# Patient Record
Sex: FEMALE | Race: ASIAN | NOT HISPANIC OR LATINO | URBAN - METROPOLITAN AREA
[De-identification: names, ages, dates, MRNs, and addresses within clinical notes are randomized per-mention and may not be internally consistent; named-entity substitution may affect disease eponyms.]

---

## 2021-01-25 ENCOUNTER — EMERGENCY (EMERGENCY)
Facility: HOSPITAL | Age: 31
LOS: 1 days | Discharge: HOME | End: 2021-01-25
Attending: EMERGENCY MEDICINE | Admitting: EMERGENCY MEDICINE
Payer: MEDICAID

## 2021-01-25 VITALS
SYSTOLIC BLOOD PRESSURE: 131 MMHG | RESPIRATION RATE: 18 BRPM | DIASTOLIC BLOOD PRESSURE: 83 MMHG | OXYGEN SATURATION: 100 % | TEMPERATURE: 98 F | HEART RATE: 76 BPM

## 2021-01-25 PROCEDURE — 99283 EMERGENCY DEPT VISIT LOW MDM: CPT

## 2021-01-25 RX ORDER — HYDROXYZINE HCL 10 MG
1 TABLET ORAL
Qty: 15 | Refills: 0
Start: 2021-01-25 | End: 2021-01-29

## 2021-01-25 RX ORDER — DIPHENHYDRAMINE HCL 50 MG
25 CAPSULE ORAL ONCE
Refills: 0 | Status: COMPLETED | OUTPATIENT
Start: 2021-01-25 | End: 2021-01-25

## 2021-01-25 RX ORDER — FAMOTIDINE 10 MG/ML
1 INJECTION INTRAVENOUS
Qty: 10 | Refills: 0
Start: 2021-01-25 | End: 2021-02-03

## 2021-01-25 RX ORDER — FAMOTIDINE 10 MG/ML
20 INJECTION INTRAVENOUS ONCE
Refills: 0 | Status: COMPLETED | OUTPATIENT
Start: 2021-01-25 | End: 2021-01-25

## 2021-01-25 RX ORDER — EPINEPHRINE 0.3 MG/.3ML
1 INJECTION INTRAMUSCULAR; SUBCUTANEOUS
Qty: 1 | Refills: 0
Start: 2021-01-25 | End: 2021-01-25

## 2021-01-25 RX ADMIN — Medication 50 MILLIGRAM(S): at 12:35

## 2021-01-25 RX ADMIN — Medication 25 MILLIGRAM(S): at 12:35

## 2021-01-25 RX ADMIN — FAMOTIDINE 20 MILLIGRAM(S): 10 INJECTION INTRAVENOUS at 12:35

## 2021-01-25 NOTE — ED ADULT NURSE NOTE - OBJECTIVE STATEMENT
Pt received to intake area complaining of allergic reaction. Pt complaining of bilateral leg itching that spread to her face and body. Pt denies chest pain, sob. Pt medicated as ordered.

## 2021-01-25 NOTE — ED PROVIDER NOTE - PATIENT PORTAL LINK FT
You can access the FollowMyHealth Patient Portal offered by [facility] by registering at the following website: [email or web address]. By joining [employer]’s FollowMyHealth portal, you will also be able to view your health information using other applications (apps) compatible with our system.

## 2021-01-25 NOTE — ED PROVIDER NOTE - CLINICAL SUMMARY MEDICAL DECISION MAKING FREE TEXT BOX
29 Y/O F w/ no PMH presents to ER for allergic reaction. Hives throughout body. Tolerating secretions, speaking full sentences, no airway compromise. Will provide outpt course of steroids, atarax and pepcid. Discharge center coordinating Derm follow up. Will provide allergy referral. Return precautions provided. 31 Y/O F w/ no PMH presents to ER for allergic reaction. Hives throughout body. Tolerating secretions, speaking full sentences, no airway compromise. Will provide outpt course of steroids, atarax and pepcid. Discharge center coordinating allergy follow up. Return precautions provided.

## 2021-01-25 NOTE — ED PROVIDER NOTE - PHYSICAL EXAMINATION
Vital signs reviewed.   CONSTITUTIONAL: Well-appearing; well-nourished; in no apparent distress. Non-toxic appearing.   HEAD: Normocephalic, atraumatic.  EYES: PERRL, EOM intact, conjunctiva and no sclera injection noted  ENT: normal nose; no rhinorrhea; normal pharynx with no tonsillar hypertrophy, no erythema, no exudate, no trismus, tolerating secretions, patent airway  NECK: Supple; non-tender  CARD: Normal S1, S2  RESP: Normal chest excursion with respiration; breath sounds clear and equal bilaterally  ABD/GI: soft, non-distended; non-tender  EXT/MS: moves all extremities; distal pulses are normal, no pedal edema.  SKIN: Diffuse hives throughout chest, anterior/posterior neck, upper/lower back, non-tender, no discharge noted. Minimal swelling to LT hand. Upper lip swelling.  NEURO: Awake, alert, oriented x 3, no gross deficits, CN II-XII grossly intact, no motor or sensory deficit noted.  PSYCH: Normal mood; appropriate affect.

## 2021-01-25 NOTE — ED PROVIDER NOTE - NSFOLLOWUPINSTRUCTIONS_ED_ALL_ED_FT
1) Please follow up with Dermatologist for further evaluation. Please follow up with allergist  2) Return to the ED immediately for new or worsening symptoms including chest pain, shortness of breath, difficulty breathing breath, nausea/vomiting  3) Please continue to take any home medications as prescribed. Take Tylenol 325 mg every 4 hours for pain relief/fever control or ibuprofen 600 mg every 6 hours for pain relief/fever control  4) Your test results from your ED visit were discussed with you prior to discharge  5) You were provided with a copy of your test results 1) Please follow up with scheduled allergist appointment  2) Return to the ED immediately for new or worsening symptoms including chest pain, shortness of breath, difficulty breathing breath, nausea/vomiting  3) Please continue to take any home medications as prescribed. Take Tylenol 325 mg every 4 hours for pain relief/fever control or ibuprofen 600 mg every 6 hours for pain relief/fever control  4) Your test results from your ED visit were discussed with you prior to discharge  5) You were provided with a copy of your test results

## 2021-01-25 NOTE — ED PROVIDER NOTE - ATTENDING CONTRIBUTION TO CARE
I performed a history and physical exam of the patient and discussed their management with the PA. I reviewed the PA's note and agree with the documented findings and plan of care. I have edited as appropriate. My medical decision making and observations are found above.   pt p/w urticaria, dysphagia. neg voice changes, neg anaphylaxis, d/c with steroids, pepcid, and rx for epi pen. instructed to use if begins having voice changes, worsening sob,   d/c with f/u to allergist.

## 2021-01-25 NOTE — ED PROVIDER NOTE - OBJECTIVE STATEMENT
29 Y/O F w/ no PMH presents to ER for allergic reaction. Symptoms began 2 nights ago with itching to B/L legs has since spread throughout body and face. Admits to continued night awakenings due to itching. Has tried oral Benadryl with relief. Tolerating PO intake. Denies fever, nausea/vomiting, dizziness, headache, difficulty breathing or SOB. No new detergents, soap, shampoo, facial cleansers, clothing, bedding and no at home has symptoms.

## 2021-01-25 NOTE — ED PROVIDER NOTE - NS ED ROS FT
Constitutional: (-) fever   Head: Normal cephalic, Atraumatic  Eyes/ENT: (-) vision changes  Cardiovascular: (-) chest pain, (-) wheezing  Respiratory: (-) cough, (-) shortness of breath  Gastrointestinal: (-) vomiting, (-) diarrhea, (-) abdominal pain  : (-) dysuria   Musculoskeletal: (-) back pain  Integumentary: (+) rash, (+) Hives  Neurological: (-)loc  Allergic/Immunologic: (-) pruritus

## 2021-01-25 NOTE — ED ADULT TRIAGE NOTE - CHIEF COMPLAINT QUOTE
Pt c/o pruritic hives that started on her feet and spread to the rest of her body over the past 48 hours. Pt states she now feels mild burning in her throat, denies SOB, no respiratory distress noted, no difficulty swallowing. Pt denies any exposure to potential allergens. Took 50mg Benadryl at 3:30am

## 2021-01-26 RX ORDER — EPINEPHRINE 0.3 MG/.3ML
1 INJECTION INTRAMUSCULAR; SUBCUTANEOUS
Qty: 1 | Refills: 0
Start: 2021-01-26 | End: 2021-01-26

## 2021-01-26 RX ORDER — FAMOTIDINE 10 MG/ML
1 INJECTION INTRAVENOUS
Qty: 10 | Refills: 0
Start: 2021-01-26 | End: 2021-02-04

## 2021-01-26 RX ORDER — HYDROXYZINE HCL 10 MG
1 TABLET ORAL
Qty: 15 | Refills: 0
Start: 2021-01-26 | End: 2021-01-30

## 2021-01-29 PROBLEM — Z00.00 ENCOUNTER FOR PREVENTIVE HEALTH EXAMINATION: Status: ACTIVE | Noted: 2021-01-29

## 2021-02-01 ENCOUNTER — APPOINTMENT (OUTPATIENT)
Dept: ALLERGY | Facility: CLINIC | Age: 31
End: 2021-02-01

## 2021-08-02 ENCOUNTER — TRANSCRIPTION ENCOUNTER (OUTPATIENT)
Age: 31
End: 2021-08-02

## 2022-01-15 ENCOUNTER — TRANSCRIPTION ENCOUNTER (OUTPATIENT)
Age: 32
End: 2022-01-15

## 2022-01-15 ENCOUNTER — EMERGENCY (EMERGENCY)
Facility: HOSPITAL | Age: 32
LOS: 1 days | Discharge: HOME | End: 2022-01-15
Attending: STUDENT IN AN ORGANIZED HEALTH CARE EDUCATION/TRAINING PROGRAM | Admitting: STUDENT IN AN ORGANIZED HEALTH CARE EDUCATION/TRAINING PROGRAM
Payer: MEDICAID

## 2022-01-15 VITALS
OXYGEN SATURATION: 100 % | SYSTOLIC BLOOD PRESSURE: 110 MMHG | TEMPERATURE: 99 F | DIASTOLIC BLOOD PRESSURE: 68 MMHG | HEART RATE: 70 BPM | RESPIRATION RATE: 15 BRPM

## 2022-01-15 VITALS
RESPIRATION RATE: 16 BRPM | OXYGEN SATURATION: 100 % | TEMPERATURE: 99 F | SYSTOLIC BLOOD PRESSURE: 134 MMHG | DIASTOLIC BLOOD PRESSURE: 73 MMHG | HEART RATE: 105 BPM

## 2022-01-15 LAB
ALBUMIN SERPL ELPH-MCNC: 4.5 G/DL — SIGNIFICANT CHANGE UP (ref 3.3–5)
ALP SERPL-CCNC: 47 U/L — SIGNIFICANT CHANGE UP (ref 40–120)
ALT FLD-CCNC: 11 U/L — SIGNIFICANT CHANGE UP (ref 4–33)
ANION GAP SERPL CALC-SCNC: 10 MMOL/L — SIGNIFICANT CHANGE UP (ref 7–14)
APPEARANCE UR: CLEAR — SIGNIFICANT CHANGE UP
AST SERPL-CCNC: 12 U/L — SIGNIFICANT CHANGE UP (ref 4–32)
BASOPHILS # BLD AUTO: 0.02 K/UL — SIGNIFICANT CHANGE UP (ref 0–0.2)
BASOPHILS NFR BLD AUTO: 0.2 % — SIGNIFICANT CHANGE UP (ref 0–2)
BILIRUB SERPL-MCNC: 0.6 MG/DL — SIGNIFICANT CHANGE UP (ref 0.2–1.2)
BILIRUB UR-MCNC: NEGATIVE — SIGNIFICANT CHANGE UP
BUN SERPL-MCNC: 9 MG/DL — SIGNIFICANT CHANGE UP (ref 7–23)
CALCIUM SERPL-MCNC: 9.3 MG/DL — SIGNIFICANT CHANGE UP (ref 8.4–10.5)
CHLORIDE SERPL-SCNC: 103 MMOL/L — SIGNIFICANT CHANGE UP (ref 98–107)
CO2 SERPL-SCNC: 23 MMOL/L — SIGNIFICANT CHANGE UP (ref 22–31)
COLOR SPEC: YELLOW — SIGNIFICANT CHANGE UP
CREAT SERPL-MCNC: 0.59 MG/DL — SIGNIFICANT CHANGE UP (ref 0.5–1.3)
DIFF PNL FLD: NEGATIVE — SIGNIFICANT CHANGE UP
EOSINOPHIL # BLD AUTO: 0.09 K/UL — SIGNIFICANT CHANGE UP (ref 0–0.5)
EOSINOPHIL NFR BLD AUTO: 0.9 % — SIGNIFICANT CHANGE UP (ref 0–6)
GLUCOSE SERPL-MCNC: 98 MG/DL — SIGNIFICANT CHANGE UP (ref 70–99)
GLUCOSE UR QL: NEGATIVE — SIGNIFICANT CHANGE UP
HCT VFR BLD CALC: 34.7 % — SIGNIFICANT CHANGE UP (ref 34.5–45)
HGB BLD-MCNC: 11.4 G/DL — LOW (ref 11.5–15.5)
IANC: 8.09 K/UL — SIGNIFICANT CHANGE UP (ref 1.5–8.5)
IMM GRANULOCYTES NFR BLD AUTO: 0.5 % — SIGNIFICANT CHANGE UP (ref 0–1.5)
KETONES UR-MCNC: NEGATIVE — SIGNIFICANT CHANGE UP
LEUKOCYTE ESTERASE UR-ACNC: ABNORMAL
LIDOCAIN IGE QN: 21 U/L — SIGNIFICANT CHANGE UP (ref 7–60)
LYMPHOCYTES # BLD AUTO: 1.02 K/UL — SIGNIFICANT CHANGE UP (ref 1–3.3)
LYMPHOCYTES # BLD AUTO: 10.3 % — LOW (ref 13–44)
MCHC RBC-ENTMCNC: 29.1 PG — SIGNIFICANT CHANGE UP (ref 27–34)
MCHC RBC-ENTMCNC: 32.9 GM/DL — SIGNIFICANT CHANGE UP (ref 32–36)
MCV RBC AUTO: 88.5 FL — SIGNIFICANT CHANGE UP (ref 80–100)
MONOCYTES # BLD AUTO: 0.62 K/UL — SIGNIFICANT CHANGE UP (ref 0–0.9)
MONOCYTES NFR BLD AUTO: 6.3 % — SIGNIFICANT CHANGE UP (ref 2–14)
NEUTROPHILS # BLD AUTO: 8.09 K/UL — HIGH (ref 1.8–7.4)
NEUTROPHILS NFR BLD AUTO: 81.8 % — HIGH (ref 43–77)
NITRITE UR-MCNC: NEGATIVE — SIGNIFICANT CHANGE UP
NRBC # BLD: 0 /100 WBCS — SIGNIFICANT CHANGE UP
NRBC # FLD: 0 K/UL — SIGNIFICANT CHANGE UP
PH UR: 7 — SIGNIFICANT CHANGE UP (ref 5–8)
PLATELET # BLD AUTO: 221 K/UL — SIGNIFICANT CHANGE UP (ref 150–400)
POTASSIUM SERPL-MCNC: 4.1 MMOL/L — SIGNIFICANT CHANGE UP (ref 3.5–5.3)
POTASSIUM SERPL-SCNC: 4.1 MMOL/L — SIGNIFICANT CHANGE UP (ref 3.5–5.3)
PROT SERPL-MCNC: 7.7 G/DL — SIGNIFICANT CHANGE UP (ref 6–8.3)
PROT UR-MCNC: ABNORMAL
RBC # BLD: 3.92 M/UL — SIGNIFICANT CHANGE UP (ref 3.8–5.2)
RBC # FLD: 12.4 % — SIGNIFICANT CHANGE UP (ref 10.3–14.5)
SODIUM SERPL-SCNC: 136 MMOL/L — SIGNIFICANT CHANGE UP (ref 135–145)
SP GR SPEC: 1.03 — SIGNIFICANT CHANGE UP (ref 1–1.05)
UROBILINOGEN FLD QL: SIGNIFICANT CHANGE UP
WBC # BLD: 9.89 K/UL — SIGNIFICANT CHANGE UP (ref 3.8–10.5)
WBC # FLD AUTO: 9.89 K/UL — SIGNIFICANT CHANGE UP (ref 3.8–10.5)

## 2022-01-15 PROCEDURE — 99284 EMERGENCY DEPT VISIT MOD MDM: CPT

## 2022-01-15 RX ORDER — SODIUM CHLORIDE 9 MG/ML
1000 INJECTION INTRAMUSCULAR; INTRAVENOUS; SUBCUTANEOUS ONCE
Refills: 0 | Status: COMPLETED | OUTPATIENT
Start: 2022-01-15 | End: 2022-01-15

## 2022-01-15 RX ORDER — FAMOTIDINE 10 MG/ML
20 INJECTION INTRAVENOUS ONCE
Refills: 0 | Status: COMPLETED | OUTPATIENT
Start: 2022-01-15 | End: 2022-01-15

## 2022-01-15 RX ORDER — ONDANSETRON 8 MG/1
1 TABLET, FILM COATED ORAL
Qty: 28 | Refills: 0
Start: 2022-01-15 | End: 2022-01-21

## 2022-01-15 RX ORDER — ONDANSETRON 8 MG/1
4 TABLET, FILM COATED ORAL ONCE
Refills: 0 | Status: COMPLETED | OUTPATIENT
Start: 2022-01-15 | End: 2022-01-15

## 2022-01-15 RX ADMIN — SODIUM CHLORIDE 1000 MILLILITER(S): 9 INJECTION INTRAMUSCULAR; INTRAVENOUS; SUBCUTANEOUS at 19:17

## 2022-01-15 RX ADMIN — ONDANSETRON 4 MILLIGRAM(S): 8 TABLET, FILM COATED ORAL at 19:17

## 2022-01-15 RX ADMIN — FAMOTIDINE 20 MILLIGRAM(S): 10 INJECTION INTRAVENOUS at 19:17

## 2022-01-15 NOTE — ED PROVIDER NOTE - NSFOLLOWUPINSTRUCTIONS_ED_ALL_ED_FT
You were seen in the ER today for nausea and abdominal pain. Symptoms are possibly from gastritis or ulcer. It is extremely important to follow up with GI for further work-up and management of symptoms.      RETURN TO THE ER IMMEDIATELY IF:     •You have pain in your chest, neck, arm, or jaw.    •You feel extremely weak or you faint.    •You have vomit that is bright red or looks like coffee grounds.    •You have bloody or black stools or stools that look like tar.    •You have a severe headache, a stiff neck, or both.    •You have severe pain, cramping, or bloating in your abdomen.    •You have difficulty breathing or are breathing very quickly.    •Your heart is beating very quickly.    •Your skin feels cold and clammy.    •You feel confused.    •You have signs of dehydration, such as:  •Dark urine, very little urine, or no urine.    Nausea, Adult  Nausea is the feeling that you have an upset stomach or that you are about to vomit. Nausea on its own is not usually a serious concern, but it may be an early sign of a more serious medical problem. As nausea gets worse, it can lead to vomiting. If vomiting develops, or if you are not able to drink enough fluids, you are at risk of becoming dehydrated. Dehydration can make you tired and thirsty, cause you to have a dry mouth, and decrease how often you urinate. Older adults and people with other diseases or a weak disease-fighting system (immune system) are at higher risk for dehydration. The main goals of treating your nausea are:  •To relieve your nausea.    •To limit repeated nausea episodes.    •To prevent vomiting and dehydration.      Follow these instructions at home:    Watch your symptoms for any changes. Tell your health care provider about them. Follow these instructions as told by your health care provider.    Eating and drinking     •Take an oral rehydration solution (ORS). This is a drink that is sold at pharmacies and retail stores.    •Drink clear fluids slowly and in small amounts as you are able. Clear fluids include water, ice chips, low-calorie sports drinks, and fruit juice that has water added (diluted fruit juice).      •Eat bland, easy-to-digest foods in small amounts as you are able. These foods include bananas, applesauce, rice, lean meats, toast, and crackers.    •Avoid drinking fluids that contain a lot of sugar or caffeine, such as energy drinks, sports drinks, and soda.    •Avoid alcohol.    •Avoid spicy or fatty foods.    General instructions     •Take over-the-counter and prescription medicines only as told by your health care provider.    •Rest at home while you recover.    •Drink enough fluid to keep your urine pale yellow.    •Breathe slowly and deeply when you feel nauseous.    •Avoid smelling things that have strong odors.    •Wash your hands often using soap and water. If soap and water are not available, use hand .    •Make sure that all people in your household wash their hands well and often.      •Keep all follow-up visits as told by your health care provider. This is important.      Contact a health care provider if:    •Your nausea gets worse.    •Your nausea does not go away after two days.    •You vomit.    •You cannot drink fluids without vomiting.    •You have any of the following:  •New symptoms.    •A fever.    •A headache.    •Muscle cramps.    •A rash.    •Pain wile urinating.      •You feel light-headed or dizzy.    •Cracked lips.    •Dry mouth.    •Sunken eyes.    •Sleepiness.    •Weakness.      These symptoms may represent a serious problem that is an emergency. Do not wait to see if the symptoms will go away. Get medical help right away. Call your local emergency services (911 in the [place] Do not drive yourself to the hospital.     Summary    •Nausea is the feeling that you have an upset stomach or that you are about to vomit. Nausea on its own is not usually a serious concern, but it may be an early sign of a more serious medical problem.    •If vomiting develops, or if you are not able to drink enough fluids, you are at risk of becoming dehydrated.    •Follow recommendations for eating and drinking and take over-the-counter and prescription medicines only as told by your health care provider.    •Contact a health care provider right away if your symptoms worsen or you have new symptoms.    •Keep all follow-up visits as told by your health care provider. This is important.        Abdominal Pain    WHAT YOU NEED TO KNOW:    Abdominal pain can be dull, achy, or sharp. You may have pain in one area of your abdomen, or in your entire abdomen. Your pain may be caused by a condition such as constipation, food sensitivity or poisoning, infection, or a blockage. Abdominal pain can also be from a hernia, appendicitis, or an ulcer. Liver, gallbladder, or kidney conditions can also cause abdominal pain. The cause of your abdominal pain may not be known.    Abdominal Organs         DISCHARGE INSTRUCTIONS:    Call your local emergency number (911 in the [place]) if:   •You have new chest pain or shortness of breath.          Return to the emergency department if:   •You have pulsing pain in your upper abdomen or lower back that suddenly becomes constant.      •Your pain is in the right lower abdominal area and worsens with movement.      •You have a fever over 100.4°F (38°C) or shaking chills.      •You are vomiting and cannot keep food or liquids down.      •Your pain does not improve or gets worse over the next 8 to 12 hours.      •You see blood in your vomit or bowel movements, or they look black and tarry.      •Your skin or the whites of your eyes turn yellow.      •You are a woman and have a large amount of vaginal bleeding that is not your monthly period.      Call your doctor if:   •You have pain in your lower back.      •You are a man and have pain in your testicles.      •You have pain when you urinate.      •You have questions or concerns about your condition or care.      Medicines:   •Prescription pain medicine may be given. Ask your healthcare provider how to take this medicine safely. Some prescription pain medicines contain acetaminophen. Do not take other medicines that contain acetaminophen without talking to your healthcare provider. Too much acetaminophen may cause liver damage. Prescription pain medicine may cause constipation. Ask your healthcare provider how to prevent or treat constipation.       •Medicines may be given to calm your stomach or prevent vomiting.      •Take your medicine as directed. Contact your healthcare provider if you think your medicine is not helping or if you have side effects. Tell him of her if you are allergic to any medicine. Keep a list of the medicines, vitamins, and herbs you take. Include the amounts, and when and why you take them. Bring the list or the pill bottles to follow-up visits. Carry your medicine list with you in case of an emergency.      Manage your symptoms:   •Apply heat on your abdomen for 20 to 30 minutes every 2 hours for as many days as directed. Heat helps decrease pain and muscle spasms.      •Make changes to the food you eat, if needed. Do not eat foods that cause abdominal pain or other symptoms. Eat small meals more often. The following changes may also help:?Eat more high-fiber foods if you are constipated. High-fiber foods include fruits, vegetables, whole-grain foods, and legumes.             ?Do not eat foods that cause gas if you have bloating. Examples include broccoli, cabbage, and cauliflower. Do not drink soda or carbonated drinks. These may also cause gas.      ?Do not eat foods or drinks that contain sorbitol or fructose if you have diarrhea and bloating. Some examples are fruit juices, candy, jelly, and sugar-free gum.      ?Do not eat high-fat foods. Examples include fried foods, cheeseburgers, hot dogs, and desserts.      ?Limit or do not have caffeine. Caffeine may make symptoms such as heartburn or nausea worse.      ?Drink more liquids to prevent dehydration from diarrhea or vomiting. Ask your healthcare provider how much liquid to drink each day and which liquids are best for you.      •Keep a diary of your abdominal pain. A diary may help your healthcare provider learn what is causing your abdominal pain. Include when the pain happens, how long it lasts, and what the pain feels like. Write down any other symptoms you have with abdominal pain. Also write down what you eat, and what symptoms you have after you eat.      •Manage your stress. Stress may cause abdominal pain. Your healthcare provider may recommend relaxation techniques and deep breathing exercises to help decrease your stress. Your healthcare provider may recommend you talk to someone about your stress or anxiety, such as a counselor or a trusted friend. Get plenty of sleep and exercise regularly.  [employer] Walking for Exercise           •Limit or do not drink alcohol. Alcohol can make your abdominal pain worse. Ask your healthcare provider if it is safe for you to drink alcohol. Also ask how much is safe for you to drink.      •Do not smoke. Nicotine and other chemicals in cigarettes can damage your esophagus and stomach. Ask your healthcare provider for information if you currently smoke and need help to quit. E-cigarettes or smokeless tobacco still contain nicotine. Talk to your healthcare provider before you use these products.      Follow up with your doctor within 24 hours or as directed: Write down your questions so you remember to ask them during your visits.

## 2022-01-15 NOTE — ED PROVIDER NOTE - PHYSICAL EXAMINATION
GEN - NAD; well appearing; A&O x3   HEAD - NC/AT   EYES- PERRL, EOMI  ENT: Airway patent, mmm  PULMONARY - CTA b/l, symmetric breath sounds. No W/R/R.  CARDIAC -s1s2, RRR, no M,G,R, No JVD  ABDOMEN - +BS, ND, epigastric TTP, soft, no guarding, no rebound, no masses , no rigidity  : No CVA TTP, no suprapubic TTP  BACK - Normal  spine   EXTREMITIES - FROM, symmetric pulses, capillary refill < 2 seconds, no edema, 5/5 strength in b/l UE and LE  SKIN - no rash or bruising   NEUROLOGIC - alert, speech clear, no focal deficits, CN II-XII grossly intact, normal gait, sensation grossly intact  PSYCH -nl mood/affect, nl insight.

## 2022-01-15 NOTE — ED PROVIDER NOTE - PROGRESS NOTE DETAILS
Patient has not had any episodes of emesis while in ER. She denies nausea at this time and is requesting d/c home.    All applicable diagnostic testing results were discussed with the patient in detail. Discharge plan was discussed with the patient and patient agrees with this plan. The patient understands Emergency Department diagnosis is a preliminary diagnosis and is often based on limited information and that the patient must adhere to the follow-up plan as discussed.  The patient understands that if symptoms worsen or if prescribed medications do not have the desired/planned effect that the patient must/may return to the closest Emergency Department at any time for further evaluation and treatment.

## 2022-01-15 NOTE — ED PROVIDER NOTE - CLINICAL SUMMARY MEDICAL DECISION MAKING FREE TEXT BOX
30 yo F denies pmh presents complaining of 5 weeks+ of abdominal pain. Likely gastritis vs PUD vs pancreatitis vs ectopic pregnancy. Symptomatic control prn. Metabolic eval with cbc, cmp, lipase.

## 2022-01-15 NOTE — ED PROVIDER NOTE - ATTENDING CONTRIBUTION TO CARE
I have personally performed a history and physical examination of the patient and discussed management with the ACP as well as the patient.  I reviewed the ACP's note and agree with the documented findings and plan of care.  I have authored and modified critical sections of the Provider Note, including but not limited to HPI, Physical Exam and MDM.    32 yo F denies pmh presents complaining of 5 weeks+ of abdominal pain. Pt states the pain is crampy and localized to the epigastric abdomen without radiation. She has been taking maalox with minimal relief. Likely gastritis vs PUD vs pancreatitis vs ectopic pregnancy. Symptomatic control prn. Metabolic eval with cbc, cmp, lipase. Po challenge.

## 2022-01-15 NOTE — ED ADULT TRIAGE NOTE - CHIEF COMPLAINT QUOTE
Pt c/o abd pain with nausea since a few months ago worsened today. Denies vomiting, fever, chills. LMP 1.5 weeks ago. Denies pmhx.

## 2022-01-15 NOTE — ED PROVIDER NOTE - OBJECTIVE STATEMENT
30 yo F denies pmh presents complaining of 5 weeks+ of abdominal pain. Pt states the pain is crampy and localized to the epigastric abdomen without radiation. She has been taking maalox with minimal relief. She states that today she came to the ED because she began having worsening pain and trouble eating. Pain is unchanged with food. LMP 2 weeks ago and normal. She denies smoking, etoh, or drug use. No other current complaints at this time. 32 yo F denies pmh presents complaining of 5 weeks+ of abdominal pain. Pt states the pain is crampy and localized to the epigastric abdomen without radiation. She has been taking Maalox with minimal relief. She states that today she came to the ED because she began having worsening pain and trouble eating. Pain is unchanged with food. LMP 2 weeks ago and normal. She denies smoking, etoh, or drug use. No other current complaints at this time.

## 2022-01-15 NOTE — ED PROVIDER NOTE - PATIENT PORTAL LINK FT
You can access the FollowMyHealth Patient Portal offered by [facility] by registering at the following website: [email or web address]. By joining [employer]’s FollowMyHealth portal, you will also be able to view your health information using other applications (apps) compatible with our system.

## 2022-01-15 NOTE — ED PROVIDER NOTE - NS ED ROS FT
ROS   GENERAL: No fever, chills, malaise, fatigue   ENT No earache, coryza, sore throat   NECK: No stiffness swollen glands or dysphagia   RESPIRATORY: No cough, dyspnea, pleuritic chest pain   HEART: no chest pain   ABDOMEN: + abdominal pain, +nausea, no vomiting or diarrhea   : No dysuria, increase frequency or urgency, No discharge   MUSCULAR-SKELETAL: No myalgia, arthralgia   NEUROLOGY: No headache, vertigo, paresthesia, focal deficits, diplopia   SKIN: No rash, abrasion   All other ROS are negative

## 2022-01-16 PROBLEM — Z78.9 OTHER SPECIFIED HEALTH STATUS: Chronic | Status: ACTIVE | Noted: 2021-01-25

## 2022-10-08 ENCOUNTER — EMERGENCY (EMERGENCY)
Facility: HOSPITAL | Age: 32
LOS: 1 days | Discharge: HOME | End: 2022-10-08
Admitting: EMERGENCY MEDICINE

## 2022-10-08 VITALS
HEART RATE: 77 BPM | DIASTOLIC BLOOD PRESSURE: 78 MMHG | SYSTOLIC BLOOD PRESSURE: 126 MMHG | OXYGEN SATURATION: 100 % | TEMPERATURE: 98 F | RESPIRATION RATE: 18 BRPM

## 2022-10-08 PROCEDURE — 99283 EMERGENCY DEPT VISIT LOW MDM: CPT

## 2022-10-08 RX ORDER — AMOXICILLIN 250 MG/5ML
1 SUSPENSION, RECONSTITUTED, ORAL (ML) ORAL
Qty: 20 | Refills: 0
Start: 2022-10-08 | End: 2022-10-17

## 2022-10-08 NOTE — ED PROVIDER NOTE - PHYSICAL EXAMINATION
Vital signs reviewed.   CONSTITUTIONAL: Well-appearing; well-nourished; in no apparent distress. Non-toxic appearing.   HEAD: Normocephalic, atraumatic.  EYES: Normal conjunctiva and no sclera injection noted  ENT: normal nose; no rhinorrhea; normal pharynx. RT TM bulging erythematous. No tenderness to tragus or mastoid  CARD: Normal S1, S2  RESP: Normal chest excursion with respiration; breath sounds clear and equal bilaterally  EXT/MS: moves all extremities; distal pulses are normal, no pedal edema.  SKIN: Normal for age and race; warm; dry; good turgor; no apparent lesions or exudate noted.  NEURO: Awake, alert, oriented x 3,  PSYCH: Normal mood; appropriate affect.

## 2022-10-08 NOTE — ED PROVIDER NOTE - OBJECTIVE STATEMENT
33 Y/O F w/ no PMH presents to ER w/ RT ear pain and discharge. Admits to onset of symptoms 5 days ago after utilizing q-tip to clean ear. Developed pain and muffled hearing to affected ear. No use of debrox/peroxide. No recent swimming, ear travel and no hx of diabetes. Experiencing sinus congestion. Denies fever, nausea/vomiting, dizziness, headache, chest pain

## 2022-10-08 NOTE — ED ADULT TRIAGE NOTE - CHIEF COMPLAINT QUOTE
Pt st " I have rt ear pain a throbbing type pain and there is a discharge feels like there is a bunch of water in ear...there is a lot of pressure started about 5 days ago after cleaning ear with a QTIP....I also cant hear for ear. " Pt appears in no acute discomfort, calm smiling affect.. no drainage noted in rt ear canal

## 2022-10-08 NOTE — ED PROVIDER NOTE - CCCP TRG CHIEF CMPLNT
Telephone Encounter by Nataly Qiu DO at 02/19/18 02:25 PM     Author:  Nataly Qiu DO Service:  (none) Author Type:  Physician     Filed:  02/19/18 02:25 PM Encounter Date:  2/19/2018 Status:  Signed     :  Nataly Qiu DO (Physician)            Prescription filled today[SD1.1M]      Revision History        User Key Date/Time User Provider Type Action    > SD1.1 02/19/18 02:25 PM Nataly Qiu DO Physician Sign    M - Manual             rt ear pain/ear discharge

## 2022-10-08 NOTE — ED PROVIDER NOTE - NS ED ROS FT
Constitutional: (-) fever   Head: Normal cephalic, Atraumatic  Eyes/ENT: (+) Ear pain RT  Cardiovascular: (-) chest pain, (-) wheezing  Respiratory: (-) cough, (-) shortness of breath  Gastrointestinal: (-) vomiting, (-) diarrhea, (-) abdominal pain  : (-) dysuria   Musculoskeletal: (-) back pain  Integumentary: (-) rash, (-) edema  Neurological: (-)loc  Allergic/Immunologic: (-) pruritus

## 2022-10-08 NOTE — ED ADULT TRIAGE NOTE - GLASGOW COMA SCALE: SCORE, MLM
What Type Of Note Output Would You Prefer (Optional)?: Standard Output How Severe Is Your Rash?: mild Is This A New Presentation, Or A Follow-Up?: Rash 15

## 2022-11-30 ENCOUNTER — EMERGENCY (EMERGENCY)
Facility: HOSPITAL | Age: 32
LOS: 1 days | Discharge: LEFT BEFORE DISCHARGE | End: 2022-11-30
Admitting: EMERGENCY MEDICINE

## 2022-11-30 VITALS
SYSTOLIC BLOOD PRESSURE: 130 MMHG | OXYGEN SATURATION: 100 % | DIASTOLIC BLOOD PRESSURE: 97 MMHG | RESPIRATION RATE: 18 BRPM | TEMPERATURE: 98 F | HEART RATE: 95 BPM

## 2022-11-30 PROCEDURE — L9991: CPT

## 2022-11-30 NOTE — ED ADULT TRIAGE NOTE - CHIEF COMPLAINT QUOTE
Pt c/o chest tightness, SOB, headache, "neck tightness" 3x days, persistent cough for 1x month. Denies PMH

## 2023-04-10 NOTE — ED ADULT TRIAGE NOTE - NS ED TRIAGE AVPU SCALE
Alert-The patient is alert, awake and responds to voice. The patient is oriented to time, place, and person. The triage nurse is able to obtain subjective information. Dapsone Counseling: I discussed with the patient the risks of dapsone including but not limited to hemolytic anemia, agranulocytosis, rashes, methemoglobinemia, kidney failure, peripheral neuropathy, headaches, GI upset, and liver toxicity.  Patients who start dapsone require monitoring including baseline LFTs and weekly CBCs for the first month, then every month thereafter.  The patient verbalized understanding of the proper use and possible adverse effects of dapsone.  All of the patient's questions and concerns were addressed.

## 2023-11-04 ENCOUNTER — EMERGENCY (EMERGENCY)
Facility: HOSPITAL | Age: 33
LOS: 1 days | Discharge: HOME | End: 2023-11-04
Admitting: EMERGENCY MEDICINE
Payer: MEDICAID

## 2023-11-04 VITALS
TEMPERATURE: 98 F | SYSTOLIC BLOOD PRESSURE: 114 MMHG | WEIGHT: 125 LBS | HEART RATE: 73 BPM | RESPIRATION RATE: 16 BRPM | OXYGEN SATURATION: 100 % | DIASTOLIC BLOOD PRESSURE: 74 MMHG

## 2023-11-04 PROCEDURE — 99284 EMERGENCY DEPT VISIT MOD MDM: CPT

## 2023-11-04 RX ORDER — CEFDINIR 250 MG/5ML
1 POWDER, FOR SUSPENSION ORAL
Qty: 14 | Refills: 0
Start: 2023-11-04 | End: 2023-11-10

## 2023-11-04 NOTE — ED ADULT TRIAGE NOTE - CHIEF COMPLAINT QUOTE
eye pain    pt has swelling left eyelid since Monday.  went to urgent care and told had blepharitis and a stye ... started drops and erythromycin topical.  states swelling and pain worse.. gradual blurriness for 2 days to left eye also.  past medical history- anxiety/depression

## 2023-11-04 NOTE — ED PROVIDER NOTE - PROGRESS NOTE DETAILS
RYAN MENENDEZ:  Pt medically stable for discharge.  Strict return precautions given.  Pt to follow up with PMD and ophtho.

## 2023-11-04 NOTE — ED PROVIDER NOTE - CLINICAL SUMMARY MEDICAL DECISION MAKING FREE TEXT BOX
Pt is a 34 y/o F with no pertinent PMHx p/w left eyelid pain/swelling x 6 days.  Pt reports developing redness, swelling and crusting in the morning, with which pt gets intermittent blurred vision.  She states she was evaluated at urgent care and was told she had a stye and blepharitis, for which pt states she was prescribed antibiotic eye drops and erythromycin ointment at bedtime.  Pt denies any fevers, chills, double vision, pain with eye movement, vision loss, eye trauma, headache.  This is a patient with possible blepharitis, possible developing stye, possible preseptal cellulitis; very low clinical suspicion for disease processes including but not limited to conjunctivitis, orbital cellulitis.  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

## 2023-11-04 NOTE — ED PROVIDER NOTE - OBJECTIVE STATEMENT
Pt is a 32 y/o F with no pertinent PMHx p/w left eyelid pain/swelling x 6 days.  Pt reports developing redness, swelling and crusting in the morning, with which pt gets intermittent blurred vision.  She states she was evaluated at urgent care and was told she had a stye and blepharitis, for which pt states she was prescribed antibiotic eye drops and erythromycin ointment at bedtime.  Pt denies any fevers, chills, double vision, pain with eye movement, vision loss, eye trauma, headache.

## 2023-11-04 NOTE — ED PROVIDER NOTE - NSFOLLOWUPINSTRUCTIONS_ED_ALL_ED_FT
Advance activity as tolerated.  Continue all previously prescribed medications as directed unless otherwise instructed.  Take Cefdinir 300 mg one pill twice a day for 7 days.  Take Bactrim one pill twice a day for 7 days.  Follow up with your primary care physician and ophthalmology clinic ([address] [place] [address], [address], [place], [place] ([phone]))  in 48-72 hours- bring copies of your results.  Return to the ER for worsening or persistent symptoms, including but not limited to worsening/persistent eye pain, blurred vision, vision loss, double vision, pain with eye movement, fevers, headache, and/or ANY NEW OR CONCERNING SYMPTOMS. If you have issues obtaining follow up, please call: [phone] to obtain a doctor or specialist who takes your insurance in your area.  You may call [phone] to make an appointment with the internal medicine clinic.      PERIORBITAL CELLULITIS - General Information    Periorbital Cellulitis    WHAT YOU NEED TO KNOW:    What is periorbital cellulitis? Periorbital cellulitis, or preseptal cellulitis, is a bacterial infection of your eyelid or the skin around your eye. The infection can develop from a scratch or insect bite around the eye. Periorbital cellulitis may cause vision problems. It should be treated as soon as possible to prevent complications.  Cellulitis of the Eye    What are the signs and symptoms of periorbital cellulitis? Signs and symptoms are usually seen on one eye. You may have any of the following:    Red, swollen eyelid    An eyelid that feels warm and hard    Pain or tenderness when the area is touched    A fever  How is periorbital cellulitis diagnosed? Your healthcare provider will examine your eye. He or she will test your eye movement and vision. You may need blood tests to show what kind of bacteria are causing your infection. Other tests may be needed to see if the infection has spread.    How is periorbital cellulitis treated? If your periorbital cellulitis is severe, you may need IV antibiotics in the hospital. If the infection is not treated, it can spread through your body and become life-threatening. You may need any of the following medicines:    Antibiotics help treat a bacterial infection.    Acetaminophen decreases pain and fever. It is available without a doctor's order. Ask how much to take and how often to take it. Follow directions. Read the labels of all other medicines you are using to see if they also contain acetaminophen, or ask your doctor or pharmacist. Acetaminophen can cause liver damage if not taken correctly.    NSAIDs, such as ibuprofen, help decrease swelling, pain, and fever. This medicine is available with or without a doctor's order. NSAIDs can cause stomach bleeding or kidney problems in certain people. If you take blood thinner medicine, always ask your healthcare provider if NSAIDs are safe for you. Always read the medicine label and follow directions.  How can I manage my symptoms?    Wash the area with soap and water every day. Gently pat dry. Use bandages if directed by your healthcare provider.    Do not rub or scratch your eyes. This can increase your risk for spreading the infection.    Place a cool, damp cloth on the area. Use clean cloths and clean water. You can do this as often as you need to. Cool, damp cloths may help decrease pain.    Wash your hands often. Use soap and water. Wash your hands after you use the bathroom, change a child's diapers, or sneeze. Wash your hands before you prepare or eat food. Use lotion to prevent dry, cracked skin.  Handwashing  How can an eye infection be prevented?    Wear proper safety equipment. Protect your face from injury during sports and other activities.    Keep wounds clean and dry. Clean wounds on the face with soap and water. Cover wounds with a dry bandage if needed.  When should I seek immediate care?    You lose vision in your infected eye.    You have vision problems, such as double vision.    You have difficulty moving your eyeball.    You cannot close your eye due to swelling.    You develop a headache and are vomiting.    You have a stiff neck.    You see red streaks coming from the infected area.  When should I call my doctor?    Your symptoms do not get better within 24 hours of treatment.    The red, warm, swollen area gets larger.    Your fever or pain does not go away or gets worse.    You have questions or concerns about your condition or care.  CARE AGREEMENT:    You have the right to help plan your care. Learn about your health condition and how it may be treated. Discuss treatment options with your healthcare providers to decide what care you want to receive. You always have the right to refuse treatment.    © [employer] 1973, 2023        STYE - DrugNote    Stye    WHAT YOU NEED TO KNOW:    What is a stye? A stye is a lump on the edge or inside of your upper or lower eyelid. A stye usually starts to get better within 1 week and is often gone within 2 weeks.  Eye Anatomy    What causes a stye? A stye forms when bacteria causes inflammation and infection of a skin gland or follicle. A follicle is the place at the edge of the eyelid where the eyelash comes out. Styes form more often in children and in people who have an eye problem called blepharitis (eyelid inflammation).    What are the signs and symptoms of a stye?    Warmth, redness, and swelling along your eyelid    A painful, pus-filled lump on your eyelid    A gritty feeling in your eye    Tearing more than usual  How is a stye diagnosed and treated? Your healthcare provider will examine your eyelid. Tell your provider about your symptoms and when you first noticed the lump. Antibiotics may be needed to treat the stye. This medicine is given as an ointment to put into your eye.    How do I manage a stye?    Use warm compresses, as directed. This will help decrease swelling and pain. Wet a clean washcloth with warm water and place it on your eye for 10 to 15 minutes, 3 to 4 times each day, or as directed.    Keep your hands away from your eye. This helps to prevent the spread of infection to other parts of the eye. Wash your hands often with soap and dry with a clean towel. Do not squeeze the stye.    Do not wear eye makeup while you have a stye. Eye makeup may carry bacteria and cause another stye. Throw away eye makeup and brushes used to apply the makeup. Use new eye makeup after the stye has gone away. Do not share eye makeup with others.  What can I do to prevent another stye? Wash your face and clean your eyelashes every day. Remove eye makeup with makeup remover. This helps to completely remove eye makeup without heavy rubbing.    When should I call my doctor?    You have redness and discharge around your eye, and your eye pain is getting worse.    Your vision changes.    The stye has not gone away within 2 weeks.    You have questions or concerns about your condition or care.  CARE AGREEMENT:    You have the right to help plan your care. Learn about your health condition and how it may be treated. Discuss treatment options with your healthcare providers to decide what care you want to receive. You always have the right to refuse treatment.    © [employer] 1973, 2023

## 2023-11-04 NOTE — ED PROVIDER NOTE - PATIENT PORTAL LINK FT
You can access the FollowMyHealth Patient Portal offered by [facility] by registering at the following website: [email or web address]. By joining [facility] FollowMyHealth portal, you will also be able to view your health information using other applications (apps) compatible with our system.

## 2024-03-31 ENCOUNTER — EMERGENCY (EMERGENCY)
Facility: HOSPITAL | Age: 34
LOS: 1 days | Discharge: HOME | End: 2024-03-31
Admitting: EMERGENCY MEDICINE
Payer: MEDICAID

## 2024-03-31 VITALS
DIASTOLIC BLOOD PRESSURE: 83 MMHG | TEMPERATURE: 98 F | SYSTOLIC BLOOD PRESSURE: 123 MMHG | HEART RATE: 78 BPM | OXYGEN SATURATION: 100 % | RESPIRATION RATE: 17 BRPM

## 2024-03-31 PROCEDURE — 99283 EMERGENCY DEPT VISIT LOW MDM: CPT | Mod: 25

## 2024-03-31 NOTE — ED ADULT TRIAGE NOTE - NS ED TRIAGE AVPU SCALE
Alert-The patient is alert, awake and responds to voice. The patient is oriented to time, place, and person. The triage nurse is able to obtain subjective information. FAMILY HISTORY:  Father  Still living? No  FH: myocardial infarction, Age at diagnosis: Age Unknown  FH: myocardial infarction, Age at diagnosis: Age Unknown    Sibling  Still living? No  Family history of type 2 diabetes mellitus in brother, Age at diagnosis: Age Unknown

## 2024-03-31 NOTE — ED ADULT TRIAGE NOTE - CHIEF COMPLAINT QUOTE
Pt reporting to the ED for L eye redness starting this morning. Reports eye pressure  starting at 12 in the afternoon. no injury to the area, no drainage or ithcing.  no pmh

## 2024-04-01 NOTE — ED PROVIDER NOTE - PROGRESS NOTE DETAILS
PA NEPTALI: Patient reassessed, sitting comfortably in chair in NAD, denies any complaints. States feeling better, symptoms improved after tetracaine drops. Exam w/ uptake in left medial sclera, likely abrasion. Pt is medically stable for discharge and follow up with PMD and opthalmology. The patient was given verbal and written discharge instructions. Specifically, instructions when to return to the ED and when to seek follow-up from their pcp was discussed. Any specialty follow-up was discussed, including how to make an appointment.  Instructions were discussed in simple, plain language and was understood by the patient. The patient understands that should their symptoms worsen or any new symptoms arise, they should return to the ED immediately for further evaluation. All pt's questions were answered. Patient verbalizes understanding.

## 2024-04-01 NOTE — ED PROVIDER NOTE - PATIENT PORTAL LINK FT
You can access the FollowMyHealth Patient Portal offered by [facility] by registering at the following website: [email or web address]. By joining [facility]s FollowMyHealth portal, you will also be able to view your health information using other applications (apps) compatible with our system.

## 2024-04-01 NOTE — ED PROVIDER NOTE - NSFOLLOWUPINSTRUCTIONS_ED_ALL_ED_FT
Ophthalmology clinic: [address] [facility] [address], [place], [place], call ([phone]) to make next available appointment.     Rest, drink plenty of fluids.  Advance activity as tolerated.  Continue all previously prescribed medications as directed.  Follow up with your primary care physician and opthalmology in 48-72 hours- bring copies of your results.  Return to the ER for worsening or persistent symptoms, and/or ANY NEW OR CONCERNING SYMPTOMS. If you have issues obtaining follow up, please call: [phone] to obtain a doctor or specialist who takes your insurance in your area.     Use Erythromycin ophthalmic ointment applied four times daily for one week.

## 2024-04-01 NOTE — ED PROVIDER NOTE - OBJECTIVE STATEMENT
34 yo F with no PMH, presents to ED c/o left eye redness and pain x2 days, atraumatic. Reports woke up with redness to medial aspect of eye and sore to touch and w/ eye movement. Admits stye to right upper eyelid which she normally gets them. Admits slight blurriness in left eye. Denies any fever, chills, double vision, floaters, flashes, eye discharge, photophobia, tearing, headache, dizziness, or any other complaints.

## 2024-04-01 NOTE — ED PROVIDER NOTE - CLINICAL SUMMARY MEDICAL DECISION MAKING FREE TEXT BOX
34 yo F with no PMH, presents to ED c/o left eye redness and pain x2 days, atraumatic. well appearing female. no contact lens use. no injury. no foreign body. Suspect abrasion in left eye given localized redness. less likely conjunctivitis. unlikely acute glaucoma. Plan: Check acuity, fluorescein stain, and reassess, likely ophthalmology follow up.

## 2024-07-23 ENCOUNTER — NON-APPOINTMENT (OUTPATIENT)
Age: 34
End: 2024-07-23

## 2024-10-22 ENCOUNTER — APPOINTMENT (OUTPATIENT)
Dept: OTOLARYNGOLOGY | Facility: CLINIC | Age: 34
End: 2024-10-22

## 2025-02-21 ENCOUNTER — NON-APPOINTMENT (OUTPATIENT)
Age: 35
End: 2025-02-21

## 2025-04-08 NOTE — ED ADULT NURSE NOTE - NSIMPLEMENTINTERV_GEN_ALL_ED
Pt family came to nurses station asking to speak with provider.   Implemented All Universal Safety Interventions:  [place] to call system. Call bell, personal items and telephone within reach. Instruct patient to call for assistance. Room bathroom lighting operational. Non-slip footwear when patient is off stretcher. Physically safe environment: no spills, clutter or unnecessary equipment. Stretcher in lowest position, wheels locked, appropriate side rails in place.